# Patient Record
Sex: FEMALE | Employment: UNEMPLOYED | URBAN - METROPOLITAN AREA
[De-identification: names, ages, dates, MRNs, and addresses within clinical notes are randomized per-mention and may not be internally consistent; named-entity substitution may affect disease eponyms.]

---

## 2017-03-11 ENCOUNTER — HOSPITAL ENCOUNTER (OUTPATIENT)
Age: 32
Setting detail: SPECIMEN
Discharge: HOME | End: 2017-03-11
Payer: COMMERCIAL

## 2017-03-11 LAB
HCT VFR BLD CALC: 40.8 % (ref 36–46)
HEMOGLOBIN: 13.7 G/DL (ref 12–16)
HIV AG/AB: NONREACTIVE
MCH RBC QN AUTO: 31.4 PG (ref 26–34)
MCHC RBC AUTO-ENTMCNC: 33.7 G/DL (ref 31–37)
MCV RBC AUTO: 93.2 FL (ref 80–100)
PDW BLD-RTO: 12.9 % (ref 11.5–14.5)
PLATELET # BLD: 309 K/UL (ref 130–400)
PMV BLD AUTO: 8.3 FL (ref 6–12)
RBC # BLD: 4.38 M/UL (ref 4–5.2)
T. PALLIDUM, IGG: NONREACTIVE
WBC # BLD: 7.1 K/UL (ref 3.5–11)

## 2017-03-11 PROCEDURE — 85027 COMPLETE CBC AUTOMATED: CPT

## 2017-03-11 PROCEDURE — P9603 ONE-WAY ALLOW PRORATED MILES: HCPCS

## 2017-03-11 PROCEDURE — 36415 COLL VENOUS BLD VENIPUNCTURE: CPT

## 2017-03-11 PROCEDURE — 87389 HIV-1 AG W/HIV-1&-2 AB AG IA: CPT

## 2017-03-11 PROCEDURE — 86780 TREPONEMA PALLIDUM: CPT

## 2017-03-19 ENCOUNTER — HOSPITAL ENCOUNTER (OUTPATIENT)
Age: 32
Setting detail: SPECIMEN
Discharge: HOME | End: 2017-03-19
Payer: COMMERCIAL

## 2017-03-19 LAB
ALBUMIN SERPL-MCNC: 3.6 G/DL (ref 3.5–5.2)
ALBUMIN/GLOBULIN RATIO: 1.4 (ref 1–2.5)
ALP BLD-CCNC: 87 U/L (ref 35–104)
ALT SERPL-CCNC: 26 U/L (ref 5–33)
ANION GAP SERPL CALCULATED.3IONS-SCNC: 12 MMOL/L (ref 9–17)
AST SERPL-CCNC: 15 U/L
BILIRUB SERPL-MCNC: 0.21 MG/DL (ref 0.3–1.2)
BUN BLDV-MCNC: 14 MG/DL (ref 6–20)
BUN/CREAT BLD: ABNORMAL (ref 9–20)
CALCIUM SERPL-MCNC: 8.8 MG/DL (ref 8.6–10.4)
CHLORIDE BLD-SCNC: 110 MMOL/L (ref 98–107)
CO2: 24 MMOL/L (ref 20–31)
CREAT SERPL-MCNC: 0.43 MG/DL (ref 0.5–0.9)
GFR AFRICAN AMERICAN: >60 ML/MIN
GFR NON-AFRICAN AMERICAN: >60 ML/MIN
GFR SERPL CREATININE-BSD FRML MDRD: ABNORMAL ML/MIN/{1.73_M2}
GFR SERPL CREATININE-BSD FRML MDRD: ABNORMAL ML/MIN/{1.73_M2}
GLUCOSE BLD-MCNC: 84 MG/DL (ref 70–99)
POTASSIUM SERPL-SCNC: 4.1 MMOL/L (ref 3.7–5.3)
SODIUM BLD-SCNC: 146 MMOL/L (ref 135–144)
T3 TOTAL: 91 NG/DL (ref 80–200)
T4 TOTAL: 7 UG/DL (ref 4.5–12)
TOTAL PROTEIN: 6.2 G/DL (ref 6.4–8.3)
TSH SERPL DL<=0.05 MIU/L-ACNC: 4.37 MIU/L (ref 0.3–5)

## 2017-03-19 PROCEDURE — 84443 ASSAY THYROID STIM HORMONE: CPT

## 2017-03-19 PROCEDURE — 84480 ASSAY TRIIODOTHYRONINE (T3): CPT

## 2017-03-19 PROCEDURE — 84436 ASSAY OF TOTAL THYROXINE: CPT

## 2017-03-19 PROCEDURE — 36415 COLL VENOUS BLD VENIPUNCTURE: CPT

## 2017-03-19 PROCEDURE — P9603 ONE-WAY ALLOW PRORATED MILES: HCPCS

## 2017-03-19 PROCEDURE — 80053 COMPREHEN METABOLIC PANEL: CPT

## 2019-05-24 ENCOUNTER — HOSPITAL ENCOUNTER (OUTPATIENT)
Age: 34
Setting detail: SPECIMEN
Discharge: HOME | End: 2019-05-24
Payer: COMMERCIAL

## 2019-05-24 ENCOUNTER — OFFICE VISIT (OUTPATIENT)
Dept: OBGYN CLINIC | Age: 34
End: 2019-05-24
Payer: COMMERCIAL

## 2019-05-24 VITALS — WEIGHT: 182 LBS | DIASTOLIC BLOOD PRESSURE: 81 MMHG | SYSTOLIC BLOOD PRESSURE: 123 MMHG | HEART RATE: 77 BPM

## 2019-05-24 DIAGNOSIS — Z01.419 ENCOUNTER FOR WELL WOMAN EXAM WITH ROUTINE GYNECOLOGICAL EXAM: Primary | ICD-10-CM

## 2019-05-24 PROCEDURE — 99385 PREV VISIT NEW AGE 18-39: CPT | Performed by: OBSTETRICS & GYNECOLOGY

## 2019-05-24 RX ORDER — FLUTICASONE PROPIONATE 50 MCG
50 SPRAY, SUSPENSION (ML) NASAL
COMMUNITY

## 2019-05-24 RX ORDER — KETOCONAZOLE 20 MG/ML
SHAMPOO TOPICAL
COMMUNITY
Start: 2018-06-07

## 2019-05-24 RX ORDER — ARIPIPRAZOLE 2 MG/1
2 TABLET ORAL DAILY
COMMUNITY

## 2019-05-24 RX ORDER — LORATADINE 10 MG/1
10 CAPSULE, LIQUID FILLED ORAL DAILY
COMMUNITY

## 2019-05-24 SDOH — HEALTH STABILITY: MENTAL HEALTH: HOW OFTEN DO YOU HAVE A DRINK CONTAINING ALCOHOL?: NEVER

## 2019-05-24 NOTE — PROGRESS NOTES
Dougie Juarez  2019                         Primary Care Physician: Merline Mound, MD    CC:   Chief Complaint   Patient presents with    Annual Exam         HPI: Dougie Juarez is a 26 y.o. female  No LMP recorded (lmp unknown). The patient was seen and examined. She is here to establish care and for an annual visit. She denies any complaints. Her periods are regular and last 7 days. She describes them as moderate. Her bowel habits are regular. She denies any bloating. She denies dysuria. She denies urinary leaking. She denies vaginal discharge. She has never been sexually active.     Depression Screen: Symptoms of decreased mood absent  Symptoms of anhedonia absent  **If either question is answered in a  positive fashion then complete the PHQ9 Scoring Evaluation and make the appropriate referral**    REVIEW OF SYSTEMS:   Constitutional: negative fever, negative chills  HEENT: negative visual disturbances, negative headaches  Respiratory: negative dyspnea, negative cough  Cardiovascular: negative chest pain,  negative palpitations  Gastrointestinal: negative abdominal pain, negative RUQ pain, negative N/V, negative diarrhea, negative constipation  Genitourinary: negative dysuria, negative vaginal discharge  Dermatological: negative rash  Hematologic: negative bruising  Immunologic/Lymphatic: negative recent illness, negative recent sick contact  Musculoskeletal: negative back pain, negative myalgias, negative arthralgias  Neurological:  negative dizziness, negative weakness  Behavior/Psych: negative depression, negative anxiety      GYNECOLOGICAL HISTORY:  Age of Menarche: unsure  Age of Menopause: n/a     Sexually Active: not sexually active  STD History: no past history    Pap History: Patient does not recall the results/date of last Pap test.  Colposcopy History: denies    Permanent Sterilization: no  Reversible Birth Control: no  Hormone Replacement Exposure: no    OBSTETRICAL HISTORY:  OB History    Para Term  AB Living   0 0 0 0 0 0   SAB TAB Ectopic Molar Multiple Live Births   0 0 0 0 0 0       PAST MEDICAL HISTORY:   has no past medical history on file. PAST SURGICAL HISTORY:   has no past surgical history on file. ALLERGIES:  has No Known Allergies. MEDICATIONS:  Prior to Admission medications    Medication Sig Start Date End Date Taking? Authorizing Provider   fluticasone (FLONASE) 50 MCG/ACT nasal spray 50 mcg   Yes Historical Provider, MD   loratadine (CLARITIN) 10 MG capsule Take 10 mg by mouth daily   Yes Historical Provider, MD   ARIPiprazole (ABILIFY) 2 MG tablet Take 2 mg by mouth daily   Yes Historical Provider, MD   ketoconazole (NIZORAL) 2 % shampoo ketoconazole 2 % shampoo 18  Yes Historical Provider, MD       FAMILY HISTORY:  Family History of Breast, Ovarian, Colon or Uterine Cancer: No   family history is not on file. SOCIAL HISTORY:   reports that she has never smoked. She has never used smokeless tobacco. She reports that she does not drink alcohol. HEALTH MAINTENANCE:  Immunization status: stated as up to date, no records available   Date of Last Mammogram: n/a  Date of Last Colonoscopy: n/a  Date of Last Bone Density: n/a    VITALS:  Vitals:    19 0847   BP: 123/81   Site: Right Upper Arm   Position: Sitting   Cuff Size: Medium Adult   Pulse: 77   Weight: 182 lb (82.6 kg)                                                                                                                                                                        PHYSICAL EXAM:   General Appearance: Appears healthy. Alert; in no acute distress. Pleasant. Skin: Skin color, texture, turgor normal. No rashes or lesions. HEENT: normocephalic and atraumatic   Respiratory: Normal expansion. Clear to auscultation. No rales, rhonchi, or wheezing.   Cardiovascular: normal rate and normal S1 and S2  Breast:  (Chest): normal appearance, no masses or tenderness, No nipple retraction or dimpling, No nipple discharge or bleeding, No axillary or supraclavicular adenopathy, Normal to palpation without dominant masses  Abdomen: obese, soft, non-tender, non-distended, no right upper quadrant tenderness and no CVA tenderness   Pelvic Exam:   External genitalia: normal hair distribution, no lesions or erythema  Urinary system: urethral meatus normal, no bladder tenderness  Vaginal: normal mucosa, no lesions or discharge noted  Cervix: normal appearing cervix without discharge or lesions, no CMT  Adnexa: normal adnexa in size, nontender and no masses  Uterus: about 6wk size, anteverted, nontender, no masses  Musculoskeletal: no gross abnormalities  Extremities: non-tender BLE and non-edematous  Psych:  oriented to time, place and person, mood and affect are within normal limits     DATA:  No results found for this visit on 19. ASSESSMENT & PLAN:    Gill Seaman is a 26 y.o. female  No LMP recorded (lmp unknown). - routine well woman exam   - pap smear with cotesting collected and sent       There are no active problems to display for this patient. Return in about 1 year (around 2020) for annual or earlier prn. No Patient Care Coordination Note on file. Counseling Completed:     Discussed need for repeat pap as per American Society for Colposcopy and Cervical Pathology guidelines. Birth control and barrier recommendations reviewed. Discussed STD counseling and prevention. Hereditary Breast, Ovarian, Colon and Uterine Cancer screening done. Tobacco & Secondary smoke risks reviewed; with recommendation for cessation and avoidance. Routine health maintenance per patients PCP     Diagnosis Orders   1.  Encounter for well woman exam with routine gynecological exam  PAP Smear          See-Ida Saleh DO   [address] [place] [address]  2019 9:43 AM

## 2019-05-28 LAB
HPV SAMPLE: NORMAL
HPV, GENOTYPE 16: NOT DETECTED
HPV, GENOTYPE 18: NOT DETECTED
HPV, HIGH RISK OTHER: NOT DETECTED
HPV, INTERPRETATION: NORMAL
SPECIMEN DESCRIPTION: NORMAL

## 2019-05-29 LAB — CYTOLOGY REPORT: NORMAL
